# Patient Record
Sex: FEMALE | Race: OTHER | NOT HISPANIC OR LATINO | URBAN - METROPOLITAN AREA
[De-identification: names, ages, dates, MRNs, and addresses within clinical notes are randomized per-mention and may not be internally consistent; named-entity substitution may affect disease eponyms.]

---

## 2019-06-07 ENCOUNTER — EMERGENCY (EMERGENCY)
Facility: HOSPITAL | Age: 34
LOS: 1 days | Discharge: HOME | End: 2019-06-07
Attending: STUDENT IN AN ORGANIZED HEALTH CARE EDUCATION/TRAINING PROGRAM
Payer: COMMERCIAL

## 2019-06-07 VITALS
HEIGHT: 67 IN | RESPIRATION RATE: 18 BRPM | TEMPERATURE: 103 F | OXYGEN SATURATION: 98 % | DIASTOLIC BLOOD PRESSURE: 66 MMHG | SYSTOLIC BLOOD PRESSURE: 108 MMHG | HEART RATE: 102 BPM | WEIGHT: 130.07 LBS

## 2019-06-07 LAB
ALBUMIN SERPL ELPH-MCNC: 4.4 G/DL — SIGNIFICANT CHANGE UP (ref 3.3–5.2)
ALP SERPL-CCNC: 78 U/L — SIGNIFICANT CHANGE UP (ref 40–120)
ALT FLD-CCNC: 17 U/L — SIGNIFICANT CHANGE UP
ANION GAP SERPL CALC-SCNC: 13 MMOL/L — SIGNIFICANT CHANGE UP (ref 5–17)
APTT BLD: 31.6 SEC — SIGNIFICANT CHANGE UP (ref 27.5–36.3)
AST SERPL-CCNC: 24 U/L — SIGNIFICANT CHANGE UP
BASOPHILS # BLD AUTO: 0 K/UL — SIGNIFICANT CHANGE UP (ref 0–0.2)
BASOPHILS NFR BLD AUTO: 0.1 % — SIGNIFICANT CHANGE UP (ref 0–2)
BILIRUB SERPL-MCNC: 0.6 MG/DL — SIGNIFICANT CHANGE UP (ref 0.4–2)
BUN SERPL-MCNC: 12 MG/DL — SIGNIFICANT CHANGE UP (ref 8–20)
CALCIUM SERPL-MCNC: 9 MG/DL — SIGNIFICANT CHANGE UP (ref 8.6–10.2)
CHLORIDE SERPL-SCNC: 101 MMOL/L — SIGNIFICANT CHANGE UP (ref 98–107)
CO2 SERPL-SCNC: 22 MMOL/L — SIGNIFICANT CHANGE UP (ref 22–29)
CREAT SERPL-MCNC: 0.83 MG/DL — SIGNIFICANT CHANGE UP (ref 0.5–1.3)
EOSINOPHIL # BLD AUTO: 0 K/UL — SIGNIFICANT CHANGE UP (ref 0–0.5)
EOSINOPHIL NFR BLD AUTO: 0.1 % — SIGNIFICANT CHANGE UP (ref 0–6)
GLUCOSE SERPL-MCNC: 135 MG/DL — HIGH (ref 70–115)
HCT VFR BLD CALC: 36.2 % — LOW (ref 37–47)
HGB BLD-MCNC: 11.7 G/DL — LOW (ref 12–16)
INR BLD: 1.11 RATIO — SIGNIFICANT CHANGE UP (ref 0.88–1.16)
LACTATE BLDV-MCNC: 1.1 MMOL/L — SIGNIFICANT CHANGE UP (ref 0.5–2)
LYMPHOCYTES # BLD AUTO: 0.7 K/UL — LOW (ref 1–4.8)
LYMPHOCYTES # BLD AUTO: 6 % — LOW (ref 20–55)
MCHC RBC-ENTMCNC: 25.7 PG — LOW (ref 27–31)
MCHC RBC-ENTMCNC: 32.3 G/DL — SIGNIFICANT CHANGE UP (ref 32–36)
MCV RBC AUTO: 79.6 FL — LOW (ref 81–99)
MONOCYTES # BLD AUTO: 0.5 K/UL — SIGNIFICANT CHANGE UP (ref 0–0.8)
MONOCYTES NFR BLD AUTO: 4.4 % — SIGNIFICANT CHANGE UP (ref 3–10)
NEUTROPHILS # BLD AUTO: 10.9 K/UL — HIGH (ref 1.8–8)
NEUTROPHILS NFR BLD AUTO: 89.2 % — HIGH (ref 37–73)
PLATELET # BLD AUTO: 259 K/UL — SIGNIFICANT CHANGE UP (ref 150–400)
POTASSIUM SERPL-MCNC: 3.7 MMOL/L — SIGNIFICANT CHANGE UP (ref 3.5–5.3)
POTASSIUM SERPL-SCNC: 3.7 MMOL/L — SIGNIFICANT CHANGE UP (ref 3.5–5.3)
PROT SERPL-MCNC: 7.8 G/DL — SIGNIFICANT CHANGE UP (ref 6.6–8.7)
PROTHROM AB SERPL-ACNC: 12.8 SEC — SIGNIFICANT CHANGE UP (ref 10–12.9)
RBC # BLD: 4.55 M/UL — SIGNIFICANT CHANGE UP (ref 4.4–5.2)
RBC # FLD: 15.2 % — SIGNIFICANT CHANGE UP (ref 11–15.6)
SODIUM SERPL-SCNC: 136 MMOL/L — SIGNIFICANT CHANGE UP (ref 135–145)
WBC # BLD: 12.2 K/UL — HIGH (ref 4.8–10.8)
WBC # FLD AUTO: 12.2 K/UL — HIGH (ref 4.8–10.8)

## 2019-06-07 PROCEDURE — 99284 EMERGENCY DEPT VISIT MOD MDM: CPT

## 2019-06-07 PROCEDURE — 71046 X-RAY EXAM CHEST 2 VIEWS: CPT | Mod: 26

## 2019-06-07 PROCEDURE — 93010 ELECTROCARDIOGRAM REPORT: CPT

## 2019-06-07 RX ORDER — SODIUM CHLORIDE 9 MG/ML
1850 INJECTION INTRAMUSCULAR; INTRAVENOUS; SUBCUTANEOUS ONCE
Refills: 0 | Status: COMPLETED | OUTPATIENT
Start: 2019-06-07 | End: 2019-06-07

## 2019-06-07 RX ORDER — ONDANSETRON 8 MG/1
4 TABLET, FILM COATED ORAL ONCE
Refills: 0 | Status: COMPLETED | OUTPATIENT
Start: 2019-06-07 | End: 2019-06-07

## 2019-06-07 RX ADMIN — SODIUM CHLORIDE 1850 MILLILITER(S): 9 INJECTION INTRAMUSCULAR; INTRAVENOUS; SUBCUTANEOUS at 23:29

## 2019-06-07 RX ADMIN — SODIUM CHLORIDE 1850 MILLILITER(S): 9 INJECTION INTRAMUSCULAR; INTRAVENOUS; SUBCUTANEOUS at 22:29

## 2019-06-07 RX ADMIN — ONDANSETRON 4 MILLIGRAM(S): 8 TABLET, FILM COATED ORAL at 23:09

## 2019-06-07 NOTE — ED ADULT NURSE NOTE - OBJECTIVE STATEMENT
Patient presents with chills, body aches and throat pain that started today.  Code sepsis called and dr martínez at bedside.  Patient is resting in bed, lungs are clear, c/o nausea/vomiting.

## 2019-06-07 NOTE — ED PROVIDER NOTE - OBJECTIVE STATEMENT
Pt is a 35 y/o F presenting to the ED with c/o flu like illness. Pt states she woke up and went to work feeling fine and at her baseline. Came home from work when  noticed that she did not appear well. Took her temp and pt was febrile with Tmax 102. Pt c/o generalized fatigue, myalgias, and vomiting. Denies recent cough, ear pain, sore throat, rhinorrhea, CP, SOB, abd pain, urinary sxs, diarrhea, and rash. + sick contact with  who was recently ill with viral like illness Pt also in school. Pt is a 33 y/o F presenting to the ED with c/o flu like illness. Pt states she woke up and went to work feeling fine and at her baseline. Came home from work when  noticed that she did not appear well. Took her temp and pt was febrile with Tmax 102. Pt c/o generalized fatigue, myalgias, and vomiting. Denies recent cough, ear pain, sore throat, rhinorrhea, CP, SOB, abd pain, urinary sxs, diarrhea, and rash. + sick contact with  who was recently ill with viral like illness. Pt also in school.

## 2019-06-08 VITALS
TEMPERATURE: 99 F | OXYGEN SATURATION: 98 % | DIASTOLIC BLOOD PRESSURE: 58 MMHG | SYSTOLIC BLOOD PRESSURE: 103 MMHG | HEART RATE: 87 BPM | RESPIRATION RATE: 19 BRPM

## 2019-06-08 LAB
APPEARANCE UR: CLEAR — SIGNIFICANT CHANGE UP
BILIRUB UR-MCNC: NEGATIVE — SIGNIFICANT CHANGE UP
COLOR SPEC: YELLOW — SIGNIFICANT CHANGE UP
DIFF PNL FLD: ABNORMAL
EPI CELLS # UR: SIGNIFICANT CHANGE UP
FLU A RESULT: SIGNIFICANT CHANGE UP
FLU A RESULT: SIGNIFICANT CHANGE UP
FLUAV AG NPH QL: SIGNIFICANT CHANGE UP
FLUBV AG NPH QL: SIGNIFICANT CHANGE UP
GLUCOSE UR QL: NEGATIVE MG/DL — SIGNIFICANT CHANGE UP
KETONES UR-MCNC: NEGATIVE — SIGNIFICANT CHANGE UP
LEUKOCYTE ESTERASE UR-ACNC: ABNORMAL
NITRITE UR-MCNC: NEGATIVE — SIGNIFICANT CHANGE UP
PH UR: 7 — SIGNIFICANT CHANGE UP (ref 5–8)
PROT UR-MCNC: 15 MG/DL
RBC CASTS # UR COMP ASSIST: ABNORMAL /HPF (ref 0–4)
RSV RESULT: SIGNIFICANT CHANGE UP
RSV RNA RESP QL NAA+PROBE: SIGNIFICANT CHANGE UP
SP GR SPEC: 1 — LOW (ref 1.01–1.02)
UROBILINOGEN FLD QL: NEGATIVE MG/DL — SIGNIFICANT CHANGE UP
WBC UR QL: SIGNIFICANT CHANGE UP

## 2019-06-08 PROCEDURE — 85730 THROMBOPLASTIN TIME PARTIAL: CPT

## 2019-06-08 PROCEDURE — 83605 ASSAY OF LACTIC ACID: CPT

## 2019-06-08 PROCEDURE — 87040 BLOOD CULTURE FOR BACTERIA: CPT

## 2019-06-08 PROCEDURE — 85027 COMPLETE CBC AUTOMATED: CPT

## 2019-06-08 PROCEDURE — 85610 PROTHROMBIN TIME: CPT

## 2019-06-08 PROCEDURE — 99284 EMERGENCY DEPT VISIT MOD MDM: CPT | Mod: 25

## 2019-06-08 PROCEDURE — 93005 ELECTROCARDIOGRAM TRACING: CPT

## 2019-06-08 PROCEDURE — 81001 URINALYSIS AUTO W/SCOPE: CPT

## 2019-06-08 PROCEDURE — 80053 COMPREHEN METABOLIC PANEL: CPT

## 2019-06-08 PROCEDURE — 96374 THER/PROPH/DIAG INJ IV PUSH: CPT

## 2019-06-08 PROCEDURE — 96375 TX/PRO/DX INJ NEW DRUG ADDON: CPT

## 2019-06-08 PROCEDURE — 87631 RESP VIRUS 3-5 TARGETS: CPT

## 2019-06-08 PROCEDURE — 87086 URINE CULTURE/COLONY COUNT: CPT

## 2019-06-08 PROCEDURE — 36415 COLL VENOUS BLD VENIPUNCTURE: CPT

## 2019-06-08 PROCEDURE — 84702 CHORIONIC GONADOTROPIN TEST: CPT

## 2019-06-08 PROCEDURE — 96361 HYDRATE IV INFUSION ADD-ON: CPT

## 2019-06-08 PROCEDURE — 71046 X-RAY EXAM CHEST 2 VIEWS: CPT

## 2019-06-08 RX ORDER — ACETAMINOPHEN 500 MG
975 TABLET ORAL ONCE
Refills: 0 | Status: COMPLETED | OUTPATIENT
Start: 2019-06-08 | End: 2019-06-08

## 2019-06-08 RX ORDER — SODIUM CHLORIDE 9 MG/ML
1000 INJECTION INTRAMUSCULAR; INTRAVENOUS; SUBCUTANEOUS ONCE
Refills: 0 | Status: COMPLETED | OUTPATIENT
Start: 2019-06-08 | End: 2019-06-08

## 2019-06-08 RX ORDER — KETOROLAC TROMETHAMINE 30 MG/ML
15 SYRINGE (ML) INJECTION ONCE
Refills: 0 | Status: DISCONTINUED | OUTPATIENT
Start: 2019-06-08 | End: 2019-06-08

## 2019-06-08 RX ADMIN — Medication 975 MILLIGRAM(S): at 03:33

## 2019-06-08 RX ADMIN — Medication 15 MILLIGRAM(S): at 03:34

## 2019-06-08 RX ADMIN — Medication 975 MILLIGRAM(S): at 01:43

## 2019-06-08 RX ADMIN — SODIUM CHLORIDE 1000 MILLILITER(S): 9 INJECTION INTRAMUSCULAR; INTRAVENOUS; SUBCUTANEOUS at 03:33

## 2019-06-08 NOTE — ED ADULT NURSE REASSESSMENT NOTE - NS ED NURSE REASSESS COMMENT FT1
Pt continues to be febrile and slightly hypotensive, notified Dr. Bridges.  Administered 1L NaCl and 15mg toradol as ordered and will reassess.

## 2019-06-09 LAB
CULTURE RESULTS: SIGNIFICANT CHANGE UP
SPECIMEN SOURCE: SIGNIFICANT CHANGE UP

## 2019-06-12 LAB
CULTURE RESULTS: SIGNIFICANT CHANGE UP
CULTURE RESULTS: SIGNIFICANT CHANGE UP
SPECIMEN SOURCE: SIGNIFICANT CHANGE UP
SPECIMEN SOURCE: SIGNIFICANT CHANGE UP

## 2020-04-21 ENCOUNTER — APPOINTMENT (OUTPATIENT)
Dept: ANTEPARTUM | Facility: CLINIC | Age: 35
End: 2020-04-21

## 2020-04-21 ENCOUNTER — APPOINTMENT (OUTPATIENT)
Dept: ANTEPARTUM | Facility: CLINIC | Age: 35
End: 2020-04-21
Payer: COMMERCIAL

## 2020-04-23 ENCOUNTER — LABORATORY RESULT (OUTPATIENT)
Age: 35
End: 2020-04-23

## 2020-04-23 ENCOUNTER — NON-APPOINTMENT (OUTPATIENT)
Age: 35
End: 2020-04-23

## 2020-04-23 ENCOUNTER — APPOINTMENT (OUTPATIENT)
Dept: OBGYN | Facility: CLINIC | Age: 35
End: 2020-04-23
Payer: COMMERCIAL

## 2020-04-23 ENCOUNTER — ASOB RESULT (OUTPATIENT)
Age: 35
End: 2020-04-23

## 2020-04-23 ENCOUNTER — APPOINTMENT (OUTPATIENT)
Dept: ANTEPARTUM | Facility: CLINIC | Age: 35
End: 2020-04-23
Payer: COMMERCIAL

## 2020-04-23 VITALS
SYSTOLIC BLOOD PRESSURE: 115 MMHG | BODY MASS INDEX: 23.06 KG/M2 | DIASTOLIC BLOOD PRESSURE: 68 MMHG | WEIGHT: 138.38 LBS | HEIGHT: 65 IN

## 2020-04-23 DIAGNOSIS — Z78.9 OTHER SPECIFIED HEALTH STATUS: ICD-10-CM

## 2020-04-23 DIAGNOSIS — Z3A.21 21 WEEKS GESTATION OF PREGNANCY: ICD-10-CM

## 2020-04-23 PROCEDURE — 76805 OB US >/= 14 WKS SNGL FETUS: CPT

## 2020-04-23 PROCEDURE — 99213 OFFICE O/P EST LOW 20 MIN: CPT

## 2020-04-23 RX ORDER — NEOMYCIN AND POLYMYXIN B SULFATES AND HYDROCORTISONE OTIC 10; 3.5; 1 MG/ML; MG/ML; [USP'U]/ML
3.5-10000-1 SUSPENSION AURICULAR (OTIC)
Qty: 10 | Refills: 0 | Status: COMPLETED | COMMUNITY
Start: 2019-11-19

## 2020-04-23 RX ORDER — CHLORHEXIDINE GLUCONATE 4 %
325 (65 FE) LIQUID (ML) TOPICAL 3 TIMES DAILY
Qty: 180 | Refills: 0 | Status: ACTIVE | COMMUNITY
Start: 2020-04-23 | End: 1900-01-01

## 2020-04-27 LAB
ALBUMIN SERPL ELPH-MCNC: 3.7 G/DL
ALP BLD-CCNC: 77 U/L
ALT SERPL-CCNC: 13 U/L
ANION GAP SERPL CALC-SCNC: 15 MMOL/L
APPEARANCE: ABNORMAL
AST SERPL-CCNC: 17 U/L
BASOPHILS # BLD AUTO: 0.04 K/UL
BASOPHILS NFR BLD AUTO: 0.4 %
BILIRUB SERPL-MCNC: 0.4 MG/DL
BILIRUBIN URINE: NEGATIVE
BLOOD URINE: NEGATIVE
BUN SERPL-MCNC: 9 MG/DL
CALCIUM SERPL-MCNC: 8.9 MG/DL
CHLORIDE SERPL-SCNC: 102 MMOL/L
CO2 SERPL-SCNC: 22 MMOL/L
COLOR: YELLOW
CREAT SERPL-MCNC: 0.58 MG/DL
EOSINOPHIL # BLD AUTO: 0.18 K/UL
EOSINOPHIL NFR BLD AUTO: 1.6 %
ESTIMATED AVERAGE GLUCOSE: 103 MG/DL
GLUCOSE QUALITATIVE U: NEGATIVE
GLUCOSE SERPL-MCNC: 41 MG/DL
HBA1C MFR BLD HPLC: 5.2 %
HCT VFR BLD CALC: 35.3 %
HGB BLD-MCNC: 11.5 G/DL
HIV1+2 AB SPEC QL IA.RAPID: NONREACTIVE
IMM GRANULOCYTES NFR BLD AUTO: 0.4 %
KETONES URINE: NEGATIVE
LEUKOCYTE ESTERASE URINE: NEGATIVE
LYMPHOCYTES # BLD AUTO: 2.24 K/UL
LYMPHOCYTES NFR BLD AUTO: 20 %
MAN DIFF?: NORMAL
MCHC RBC-ENTMCNC: 29.6 PG
MCHC RBC-ENTMCNC: 32.6 GM/DL
MCV RBC AUTO: 90.7 FL
MONOCYTES # BLD AUTO: 0.71 K/UL
MONOCYTES NFR BLD AUTO: 6.3 %
MUV AB SER-ACNC: NEGATIVE
MUV IGG SER QL IA: 8 AU/ML
NEUTROPHILS # BLD AUTO: 7.99 K/UL
NEUTROPHILS NFR BLD AUTO: 71.3 %
NITRITE URINE: NEGATIVE
PH URINE: 7
PLATELET # BLD AUTO: 246 K/UL
POTASSIUM SERPL-SCNC: 4.8 MMOL/L
PROT SERPL-MCNC: 6.3 G/DL
PROTEIN URINE: NORMAL
RBC # BLD: 3.89 M/UL
RBC # FLD: 14.4 %
SODIUM SERPL-SCNC: 139 MMOL/L
SPECIFIC GRAVITY URINE: 1.02
T GONDII AB SER-IMP: NEGATIVE
T GONDII IGG SER QL: <3 IU/ML
TSH SERPL-ACNC: 1.25 UIU/ML
UROBILINOGEN URINE: NORMAL
WBC # FLD AUTO: 11.2 K/UL

## 2020-05-26 ENCOUNTER — APPOINTMENT (OUTPATIENT)
Dept: OBGYN | Facility: CLINIC | Age: 35
End: 2020-05-26
Payer: COMMERCIAL

## 2020-05-26 ENCOUNTER — NON-APPOINTMENT (OUTPATIENT)
Age: 35
End: 2020-05-26

## 2020-05-26 VITALS
HEIGHT: 65 IN | SYSTOLIC BLOOD PRESSURE: 104 MMHG | DIASTOLIC BLOOD PRESSURE: 62 MMHG | BODY MASS INDEX: 23.49 KG/M2 | WEIGHT: 141 LBS

## 2020-05-26 PROCEDURE — 99213 OFFICE O/P EST LOW 20 MIN: CPT | Mod: TH

## 2020-05-26 PROCEDURE — 36415 COLL VENOUS BLD VENIPUNCTURE: CPT

## 2020-05-30 LAB
BASOPHILS # BLD AUTO: 0.02 K/UL
BASOPHILS NFR BLD AUTO: 0.2 %
EOSINOPHIL # BLD AUTO: 0.25 K/UL
EOSINOPHIL NFR BLD AUTO: 2.7 %
GLUCOSE 1H P 50 G GLC PO SERPL-MCNC: 131 MG/DL
HCT VFR BLD CALC: 37.4 %
HGB BLD-MCNC: 11.7 G/DL
IMM GRANULOCYTES NFR BLD AUTO: 0.3 %
LYMPHOCYTES # BLD AUTO: 1.6 K/UL
LYMPHOCYTES NFR BLD AUTO: 17.1 %
MAN DIFF?: NORMAL
MCHC RBC-ENTMCNC: 30 PG
MCHC RBC-ENTMCNC: 31.3 GM/DL
MCV RBC AUTO: 95.9 FL
MONOCYTES # BLD AUTO: 0.46 K/UL
MONOCYTES NFR BLD AUTO: 4.9 %
NEUTROPHILS # BLD AUTO: 6.97 K/UL
NEUTROPHILS NFR BLD AUTO: 74.8 %
PLATELET # BLD AUTO: 193 K/UL
RBC # BLD: 3.9 M/UL
RBC # FLD: 14.8 %
WBC # FLD AUTO: 9.33 K/UL

## 2020-06-18 ENCOUNTER — NON-APPOINTMENT (OUTPATIENT)
Age: 35
End: 2020-06-18

## 2020-06-18 ENCOUNTER — APPOINTMENT (OUTPATIENT)
Dept: OBGYN | Facility: CLINIC | Age: 35
End: 2020-06-18
Payer: COMMERCIAL

## 2020-06-18 VITALS
SYSTOLIC BLOOD PRESSURE: 100 MMHG | HEIGHT: 65 IN | DIASTOLIC BLOOD PRESSURE: 60 MMHG | WEIGHT: 142 LBS | BODY MASS INDEX: 23.66 KG/M2

## 2020-06-18 DIAGNOSIS — Z3A.29 29 WEEKS GESTATION OF PREGNANCY: ICD-10-CM

## 2020-06-18 PROCEDURE — 99213 OFFICE O/P EST LOW 20 MIN: CPT

## 2020-06-30 DIAGNOSIS — Z3A.26 26 WEEKS GESTATION OF PREGNANCY: ICD-10-CM

## 2020-06-30 DIAGNOSIS — Z34.92 ENCOUNTER FOR SUPERVISION OF NORMAL PREGNANCY, UNSPECIFIED, SECOND TRIMESTER: ICD-10-CM

## 2020-06-30 DIAGNOSIS — Z34.82 ENCOUNTER FOR SUPERVISION OF OTHER NORMAL PREGNANCY, SECOND TRIMESTER: ICD-10-CM

## 2020-07-01 ENCOUNTER — APPOINTMENT (OUTPATIENT)
Dept: ANTEPARTUM | Facility: CLINIC | Age: 35
End: 2020-07-01
Payer: COMMERCIAL

## 2020-07-01 ENCOUNTER — ASOB RESULT (OUTPATIENT)
Age: 35
End: 2020-07-01

## 2020-07-01 PROCEDURE — 76816 OB US FOLLOW-UP PER FETUS: CPT

## 2020-07-06 ENCOUNTER — NON-APPOINTMENT (OUTPATIENT)
Age: 35
End: 2020-07-06

## 2020-07-06 ENCOUNTER — APPOINTMENT (OUTPATIENT)
Dept: OBGYN | Facility: CLINIC | Age: 35
End: 2020-07-06
Payer: COMMERCIAL

## 2020-07-06 VITALS
WEIGHT: 146.5 LBS | DIASTOLIC BLOOD PRESSURE: 62 MMHG | BODY MASS INDEX: 24.41 KG/M2 | HEIGHT: 65 IN | SYSTOLIC BLOOD PRESSURE: 102 MMHG

## 2020-07-06 DIAGNOSIS — Z3A.32 32 WEEKS GESTATION OF PREGNANCY: ICD-10-CM

## 2020-07-06 PROCEDURE — 90715 TDAP VACCINE 7 YRS/> IM: CPT

## 2020-07-06 PROCEDURE — 99213 OFFICE O/P EST LOW 20 MIN: CPT | Mod: 25

## 2020-07-06 PROCEDURE — 90471 IMMUNIZATION ADMIN: CPT

## 2020-07-07 RX ORDER — VITAMIN C, CALCIUM, IRON, VITAMIN D3, VITAMIN E, THIAMIN, RIBOFLAVIN, NIACINAMIDE, VITAMIN B6, FOLIC ACID, IODINE, ZINC, COPPER, DOCUSATE SODIUM 120; 85; 30; 3; 20; 20; 1; 25; 2; 50; 159; 4.54; 150; 5; 400; 3.4 MG/1; MG/1; [IU]/1; MG/1; MG/1; MG/1; MG/1; MG/1; MG/1; MG/1; MG/1; MG/1; UG/1; MG/1; [IU]/1; MG/1
90-1 & 300 TABLET ORAL
Qty: 1 | Refills: 3 | Status: ACTIVE | COMMUNITY
Start: 2020-04-23 | End: 1900-01-01

## 2020-07-23 RX ORDER — ASCORBIC ACID, CALCIUM CITRATE, IRON, CHOLECALCIFEROL, PYRIDOXINE HYDROCHLORIDE, AND FOLIC ACID 20-1-25 MG
20-1 MG & KIT ORAL
Qty: 90 | Refills: 0 | Status: ACTIVE | COMMUNITY
Start: 2020-07-22 | End: 1900-01-01

## 2020-07-23 RX ORDER — FOLIC ACID 1 MG/1
1 TABLET ORAL
Qty: 30 | Refills: 6 | Status: ACTIVE | COMMUNITY
Start: 2020-03-28 | End: 1900-01-01

## 2020-07-28 ENCOUNTER — NON-APPOINTMENT (OUTPATIENT)
Age: 35
End: 2020-07-28

## 2020-07-28 ENCOUNTER — APPOINTMENT (OUTPATIENT)
Dept: OBGYN | Facility: CLINIC | Age: 35
End: 2020-07-28
Payer: COMMERCIAL

## 2020-07-28 VITALS
DIASTOLIC BLOOD PRESSURE: 75 MMHG | WEIGHT: 147 LBS | HEIGHT: 65 IN | SYSTOLIC BLOOD PRESSURE: 120 MMHG | BODY MASS INDEX: 24.49 KG/M2

## 2020-07-28 PROCEDURE — 36415 COLL VENOUS BLD VENIPUNCTURE: CPT

## 2020-07-28 PROCEDURE — 99213 OFFICE O/P EST LOW 20 MIN: CPT

## 2020-07-30 LAB
BASOPHILS # BLD AUTO: 0.02 K/UL
BASOPHILS NFR BLD AUTO: 0.2 %
EOSINOPHIL # BLD AUTO: 0.2 K/UL
EOSINOPHIL NFR BLD AUTO: 2.1 %
HCT VFR BLD CALC: 38.9 %
HGB BLD-MCNC: 11.9 G/DL
HIV1+2 AB SPEC QL IA.RAPID: NONREACTIVE
IMM GRANULOCYTES NFR BLD AUTO: 0.4 %
LYMPHOCYTES # BLD AUTO: 1.88 K/UL
LYMPHOCYTES NFR BLD AUTO: 19.9 %
MAN DIFF?: NORMAL
MCHC RBC-ENTMCNC: 29.7 PG
MCHC RBC-ENTMCNC: 30.6 GM/DL
MCV RBC AUTO: 97 FL
MONOCYTES # BLD AUTO: 0.5 K/UL
MONOCYTES NFR BLD AUTO: 5.3 %
NEUTROPHILS # BLD AUTO: 6.8 K/UL
NEUTROPHILS NFR BLD AUTO: 72.1 %
PLATELET # BLD AUTO: 176 K/UL
RBC # BLD: 4.01 M/UL
RBC # FLD: 13.4 %
WBC # FLD AUTO: 9.44 K/UL

## 2020-08-05 ENCOUNTER — ASOB RESULT (OUTPATIENT)
Age: 35
End: 2020-08-05

## 2020-08-05 ENCOUNTER — APPOINTMENT (OUTPATIENT)
Dept: ANTEPARTUM | Facility: CLINIC | Age: 35
End: 2020-08-05
Payer: COMMERCIAL

## 2020-08-05 DIAGNOSIS — Z3A.35 35 WEEKS GESTATION OF PREGNANCY: ICD-10-CM

## 2020-08-05 PROCEDURE — 76816 OB US FOLLOW-UP PER FETUS: CPT

## 2020-08-05 PROCEDURE — 76819 FETAL BIOPHYS PROFIL W/O NST: CPT

## 2020-08-11 ENCOUNTER — NON-APPOINTMENT (OUTPATIENT)
Age: 35
End: 2020-08-11

## 2020-08-11 ENCOUNTER — APPOINTMENT (OUTPATIENT)
Dept: OBGYN | Facility: CLINIC | Age: 35
End: 2020-08-11
Payer: COMMERCIAL

## 2020-08-11 VITALS — WEIGHT: 148.5 LBS | SYSTOLIC BLOOD PRESSURE: 110 MMHG | DIASTOLIC BLOOD PRESSURE: 70 MMHG | BODY MASS INDEX: 24.71 KG/M2

## 2020-08-11 PROCEDURE — 99213 OFFICE O/P EST LOW 20 MIN: CPT

## 2020-08-12 ENCOUNTER — LABORATORY RESULT (OUTPATIENT)
Age: 35
End: 2020-08-12

## 2020-08-12 DIAGNOSIS — Z3A.37 37 WEEKS GESTATION OF PREGNANCY: ICD-10-CM

## 2020-08-12 DIAGNOSIS — Z34.83 ENCOUNTER FOR SUPERVISION OF OTHER NORMAL PREGNANCY, THIRD TRIMESTER: ICD-10-CM

## 2020-08-12 DIAGNOSIS — K59.00 DISEASES OF THE DIGESTIVE SYSTEM COMPLICATING PREGNANCY, SECOND TRIMESTER: ICD-10-CM

## 2020-08-12 DIAGNOSIS — O99.612 DISEASES OF THE DIGESTIVE SYSTEM COMPLICATING PREGNANCY, SECOND TRIMESTER: ICD-10-CM

## 2020-08-12 DIAGNOSIS — Z23 ENCOUNTER FOR IMMUNIZATION: ICD-10-CM

## 2020-08-24 ENCOUNTER — APPOINTMENT (OUTPATIENT)
Dept: OBGYN | Facility: CLINIC | Age: 35
End: 2020-08-24
Payer: COMMERCIAL

## 2020-08-24 ENCOUNTER — NON-APPOINTMENT (OUTPATIENT)
Age: 35
End: 2020-08-24

## 2020-08-24 VITALS
SYSTOLIC BLOOD PRESSURE: 102 MMHG | BODY MASS INDEX: 25.37 KG/M2 | DIASTOLIC BLOOD PRESSURE: 64 MMHG | WEIGHT: 152.44 LBS

## 2020-08-24 DIAGNOSIS — Z3A.39 39 WEEKS GESTATION OF PREGNANCY: ICD-10-CM

## 2020-08-24 PROCEDURE — 99213 OFFICE O/P EST LOW 20 MIN: CPT | Mod: 25

## 2020-08-24 PROCEDURE — 59025 FETAL NON-STRESS TEST: CPT

## 2020-09-01 ENCOUNTER — INPATIENT (INPATIENT)
Facility: HOSPITAL | Age: 35
LOS: 1 days | Discharge: HOME | End: 2020-09-03
Attending: SPECIALIST | Admitting: SPECIALIST
Payer: COMMERCIAL

## 2020-09-01 ENCOUNTER — NON-APPOINTMENT (OUTPATIENT)
Age: 35
End: 2020-09-01

## 2020-09-01 ENCOUNTER — APPOINTMENT (OUTPATIENT)
Dept: OBGYN | Facility: CLINIC | Age: 35
End: 2020-09-01
Payer: COMMERCIAL

## 2020-09-01 VITALS
DIASTOLIC BLOOD PRESSURE: 79 MMHG | HEART RATE: 75 BPM | RESPIRATION RATE: 16 BRPM | SYSTOLIC BLOOD PRESSURE: 124 MMHG | TEMPERATURE: 98 F

## 2020-09-01 VITALS
DIASTOLIC BLOOD PRESSURE: 70 MMHG | SYSTOLIC BLOOD PRESSURE: 102 MMHG | HEIGHT: 65 IN | BODY MASS INDEX: 25.74 KG/M2 | WEIGHT: 154.5 LBS

## 2020-09-01 DIAGNOSIS — O26.893 OTHER SPECIFIED PREGNANCY RELATED CONDITIONS, THIRD TRIMESTER: ICD-10-CM

## 2020-09-01 DIAGNOSIS — Z3A.40 40 WEEKS GESTATION OF PREGNANCY: ICD-10-CM

## 2020-09-01 LAB
APPEARANCE UR: CLEAR — SIGNIFICANT CHANGE UP
BACTERIA # UR AUTO: ABNORMAL
BASOPHILS # BLD AUTO: 0.02 K/UL — SIGNIFICANT CHANGE UP (ref 0–0.2)
BASOPHILS NFR BLD AUTO: 0.2 % — SIGNIFICANT CHANGE UP (ref 0–2)
BILIRUB UR-MCNC: NEGATIVE — SIGNIFICANT CHANGE UP
BLD GP AB SCN SERPL QL: SIGNIFICANT CHANGE UP
COLOR SPEC: YELLOW — SIGNIFICANT CHANGE UP
DIFF PNL FLD: NEGATIVE — SIGNIFICANT CHANGE UP
EOSINOPHIL # BLD AUTO: 0.16 K/UL — SIGNIFICANT CHANGE UP (ref 0–0.5)
EOSINOPHIL NFR BLD AUTO: 1.6 % — SIGNIFICANT CHANGE UP (ref 0–6)
EPI CELLS # UR: SIGNIFICANT CHANGE UP
GLUCOSE UR QL: NEGATIVE MG/DL — SIGNIFICANT CHANGE UP
HCT VFR BLD CALC: 38.1 % — SIGNIFICANT CHANGE UP (ref 34.5–45)
HGB BLD-MCNC: 12.6 G/DL — SIGNIFICANT CHANGE UP (ref 11.5–15.5)
IMM GRANULOCYTES NFR BLD AUTO: 0.6 % — SIGNIFICANT CHANGE UP (ref 0–1.5)
KETONES UR-MCNC: NEGATIVE — SIGNIFICANT CHANGE UP
LEUKOCYTE ESTERASE UR-ACNC: ABNORMAL
LYMPHOCYTES # BLD AUTO: 2.05 K/UL — SIGNIFICANT CHANGE UP (ref 1–3.3)
LYMPHOCYTES # BLD AUTO: 20.3 % — SIGNIFICANT CHANGE UP (ref 13–44)
MCHC RBC-ENTMCNC: 30.4 PG — SIGNIFICANT CHANGE UP (ref 27–34)
MCHC RBC-ENTMCNC: 33.1 GM/DL — SIGNIFICANT CHANGE UP (ref 32–36)
MCV RBC AUTO: 92 FL — SIGNIFICANT CHANGE UP (ref 80–100)
MONOCYTES # BLD AUTO: 0.71 K/UL — SIGNIFICANT CHANGE UP (ref 0–0.9)
MONOCYTES NFR BLD AUTO: 7 % — SIGNIFICANT CHANGE UP (ref 2–14)
NEUTROPHILS # BLD AUTO: 7.12 K/UL — SIGNIFICANT CHANGE UP (ref 1.8–7.4)
NEUTROPHILS NFR BLD AUTO: 70.3 % — SIGNIFICANT CHANGE UP (ref 43–77)
NITRITE UR-MCNC: NEGATIVE — SIGNIFICANT CHANGE UP
PH UR: 7 — SIGNIFICANT CHANGE UP (ref 5–8)
PLATELET # BLD AUTO: 171 K/UL — SIGNIFICANT CHANGE UP (ref 150–400)
PROT UR-MCNC: NEGATIVE MG/DL — SIGNIFICANT CHANGE UP
RBC # BLD: 4.14 M/UL — SIGNIFICANT CHANGE UP (ref 3.8–5.2)
RBC # FLD: 13.5 % — SIGNIFICANT CHANGE UP (ref 10.3–14.5)
RBC CASTS # UR COMP ASSIST: NEGATIVE /HPF — SIGNIFICANT CHANGE UP (ref 0–4)
SARS-COV-2 RNA SPEC QL NAA+PROBE: SIGNIFICANT CHANGE UP
SP GR SPEC: 1 — LOW (ref 1.01–1.02)
T PALLIDUM AB TITR SER: NEGATIVE — SIGNIFICANT CHANGE UP
UROBILINOGEN FLD QL: NEGATIVE MG/DL — SIGNIFICANT CHANGE UP
WBC # BLD: 10.12 K/UL — SIGNIFICANT CHANGE UP (ref 3.8–10.5)
WBC # FLD AUTO: 10.12 K/UL — SIGNIFICANT CHANGE UP (ref 3.8–10.5)
WBC UR QL: SIGNIFICANT CHANGE UP

## 2020-09-01 PROCEDURE — 99213 OFFICE O/P EST LOW 20 MIN: CPT | Mod: 25

## 2020-09-01 PROCEDURE — 59025 FETAL NON-STRESS TEST: CPT

## 2020-09-01 RX ORDER — SODIUM CHLORIDE 9 MG/ML
1000 INJECTION, SOLUTION INTRAVENOUS
Refills: 0 | Status: DISCONTINUED | OUTPATIENT
Start: 2020-09-01 | End: 2020-09-02

## 2020-09-01 RX ORDER — CITRIC ACID/SODIUM CITRATE 300-500 MG
30 SOLUTION, ORAL ORAL ONCE
Refills: 0 | Status: DISCONTINUED | OUTPATIENT
Start: 2020-09-01 | End: 2020-09-02

## 2020-09-01 RX ORDER — OXYTOCIN 10 UNIT/ML
2 VIAL (ML) INJECTION
Qty: 30 | Refills: 0 | Status: DISCONTINUED | OUTPATIENT
Start: 2020-09-01 | End: 2020-09-01

## 2020-09-01 RX ORDER — OXYTOCIN 10 UNIT/ML
333.33 VIAL (ML) INJECTION
Qty: 20 | Refills: 0 | Status: COMPLETED | OUTPATIENT
Start: 2020-09-01 | End: 2020-09-01

## 2020-09-01 RX ORDER — AMPICILLIN TRIHYDRATE 250 MG
CAPSULE ORAL
Refills: 0 | Status: DISCONTINUED | OUTPATIENT
Start: 2020-09-01 | End: 2020-09-01

## 2020-09-01 RX ORDER — SODIUM CHLORIDE 9 MG/ML
500 INJECTION INTRAMUSCULAR; INTRAVENOUS; SUBCUTANEOUS ONCE
Refills: 0 | Status: DISCONTINUED | OUTPATIENT
Start: 2020-09-01 | End: 2020-09-03

## 2020-09-01 RX ORDER — AMPICILLIN TRIHYDRATE 250 MG
2 CAPSULE ORAL ONCE
Refills: 0 | Status: COMPLETED | OUTPATIENT
Start: 2020-09-01 | End: 2020-09-01

## 2020-09-01 RX ORDER — OXYTOCIN 10 UNIT/ML
2 VIAL (ML) INJECTION
Qty: 30 | Refills: 0 | Status: DISCONTINUED | OUTPATIENT
Start: 2020-09-01 | End: 2020-09-03

## 2020-09-01 RX ORDER — AMPICILLIN TRIHYDRATE 250 MG
1 CAPSULE ORAL EVERY 4 HOURS
Refills: 0 | Status: DISCONTINUED | OUTPATIENT
Start: 2020-09-01 | End: 2020-09-02

## 2020-09-01 RX ADMIN — Medication 108 GRAM(S): at 21:36

## 2020-09-01 RX ADMIN — Medication 216 GRAM(S): at 16:54

## 2020-09-01 RX ADMIN — Medication 2 MILLIUNIT(S)/MIN: at 21:36

## 2020-09-01 NOTE — OB PROVIDER H&P - HISTORY OF PRESENT ILLNESS
35y year old  at [id number] presented to IOL at term for nonreactive tracing in the office. Prenatal care with Dr. Cartwright  Denies: vaginal bleeding, vaginal d/c, contractions, loss of fluid. Reports uneventful pregnancy.  +fetal movement    PMH: none  PSH: none  OBH: regular cycles, no history of STI  Alllergy: NKDA  meds: PNV  Preg complications: none    T(C): 36.9 (20 @ 10:29), Max: 36.9 (20 @ 10:29)  HR: 75 (20 @ 10:29) (75 - 75)  BP: 124/79 (20 @ 10:29) (124/79 - 124/79)  RR: 16 (20 @ 10:29) (16 - 16)  Gen: NAD  Pulm: CTABL  CVR: RRR nl S1 S2  Abd: softly distended, gravid, + BS   Pelvic:   Bedside Ultrasound:  Tracin, moderate variability, + accelerations no decelerations  GBS: positive

## 2020-09-01 NOTE — OB PROVIDER H&P - ASSESSMENT
A/P: 35 year old  at [id number] IOL   -admit to L&D  -routine labs  -cytotec PO  -IV fluids  -discussed with attending Dr. Farr

## 2020-09-01 NOTE — OB PROVIDER LABOR PROGRESS NOTE - ASSESSMENT
Patient had another 2-minute prolonged decel with return to baseline after maternal repositioning, IV fluid hydration and O2 via NRB  [place]: q2-4m  SVE: 5/50/-3 (by AIYANA Durbin, patient refusing male provider)    Vital Signs Last 24 Hrs  T(C): 36.9 (01 Sep 2020 11:06), Max: 36.9 (01 Sep 2020 10:29)  T(F): 98.4 (01 Sep 2020 11:06), Max: 98.42 (01 Sep 2020 10:29)  HR: 75 (01 Sep 2020 22:43) (65 - 101)  BP: 104/56 (01 Sep 2020 22:36) (104/56 - 141/83)  RR: 16 (01 Sep 2020 11:06) (16 - 16)  SpO2: 100% (01 Sep 2020 22:43) (93% - 100%)    Dr. Cartwright aware  Will continue to monitor Patient had another 2-minute prolonged decel with return to baseline after maternal repositioning, IV fluid hydration and O2 via NRB  [place]: q2-4m  SVE: 5/50/-3 (by AIYANA Durbin, patient refusing male provider)    Vital Signs Last 24 Hrs  T(C): 36.9 (01 Sep 2020 11:06), Max: 36.9 (01 Sep 2020 10:29)  T(F): 98.4 (01 Sep 2020 11:06), Max: 98.42 (01 Sep 2020 10:29)  HR: 75 (01 Sep 2020 22:43) (65 - 101)  BP: 104/56 (01 Sep 2020 22:36) (104/56 - 141/83)  RR: 16 (01 Sep 2020 11:06) (16 - 16)  SpO2: 100% (01 Sep 2020 22:43) (93% - 100%)    Dr. Cartwright aware  Will continue to monitor  ATTENDING NOTE  Patient seen and examined at approx. 10:45 PM. She had no complaints.  moderate variability with variable decel, prolong decel at 10:37 PM x 2 minutes contractions irregular. Abd gravid, nontender. Cervical exam unchanged IUPC placed. Patient P3 art 40+ weeks with CAT 2 FHT with ROM  labor .  Start Amnioinfusion, maternal repositioning.   Will continue to closely monitor the patient.    LETTY Cartwright MD

## 2020-09-01 NOTE — OB PROVIDER LABOR PROGRESS NOTE - ASSESSMENT
35 y.o.  at 40 weeks 1 day gestation admitted for IOL for NRFHT, s/p cytotec. Cat 1 FHT. On ampicillin for GBS ppx.     - Induction: discontinue cytotec and start pitocin   - continuous toco, maternal and fetal heart tracing  - continue ampicllin    Discussed with Dr. Cartwright 35 y.o.  at 40 weeks 1 day gestation admitted for IOL for NRFHT, s/p cytotec. Cat 1 FHT. On ampicillin for GBS ppx.     - Induction: discontinue cytotec and start pitocin   - continuous toco, maternal and fetal heart tracing  - continue ampicllin    Discussed with Dr. Cartwright    ATTENDING NOTE  Patient was seen and evaluated at bedside at approx. 9 PM. She is s/p epidural. She has no complaints. VSS afebrile FHT CAT 1.   She is P3 in labor with ROM  FHT CAT 1. Start pitocin for augmentation.  I agree with above resident note.    LETTY Cartwright MD

## 2020-09-01 NOTE — OB PROVIDER H&P - ATTENDING COMMENTS
Patient admitted at 40 weeks 1 day presents from OB's office due to decreased fetal movement and a non-reactive NST for an induction of labor.  Patient counseled re: risks/benefits of induction  Poor Buckley Score - plan for cytotec induction of labor, and possible balloon placement

## 2020-09-01 NOTE — OB PROVIDER LABOR PROGRESS NOTE - ASSESSMENT
Patient noted to have 2x late decelerations in the past 20minsm last deceleration approx 7min ago.  FHT recovered with maternal repositioning, IV fluids and O2 via NRB.  Discussed with patient that we will hold off on Pitocin for now and plan to re-start it in ~30min, if the tracing is Cat 1    Vital Signs Last 24 Hrs  T(C): 36.9 (01 Sep 2020 11:06), Max: 36.9 (01 Sep 2020 10:29)  T(F): 98.4 (01 Sep 2020 11:06), Max: 98.42 (01 Sep 2020 10:29)  HR: 73 (01 Sep 2020 22:23) (65 - 101)  BP: 110/67 (01 Sep 2020 22:23) (107/64 - 141/83)  RR: 16 (01 Sep 2020 11:06) (16 - 16)  SpO2: 99% (01 Sep 2020 22:23) (93% - 100%)    d/w Dr. Cartwright

## 2020-09-02 ENCOUNTER — TRANSCRIPTION ENCOUNTER (OUTPATIENT)
Age: 35
End: 2020-09-02

## 2020-09-02 LAB
HCT VFR BLD CALC: 33.5 % — LOW (ref 34.5–45)
HGB BLD-MCNC: 11.4 G/DL — LOW (ref 11.5–15.5)
MCHC RBC-ENTMCNC: 31.6 PG — SIGNIFICANT CHANGE UP (ref 27–34)
MCHC RBC-ENTMCNC: 34 GM/DL — SIGNIFICANT CHANGE UP (ref 32–36)
MCV RBC AUTO: 92.8 FL — SIGNIFICANT CHANGE UP (ref 80–100)
PLATELET # BLD AUTO: 138 K/UL — LOW (ref 150–400)
RBC # BLD: 3.61 M/UL — LOW (ref 3.8–5.2)
RBC # FLD: 13.5 % — SIGNIFICANT CHANGE UP (ref 10.3–14.5)
WBC # BLD: 15.2 K/UL — HIGH (ref 3.8–10.5)
WBC # FLD AUTO: 15.2 K/UL — HIGH (ref 3.8–10.5)

## 2020-09-02 PROCEDURE — 59409 OBSTETRICAL CARE: CPT | Mod: U9

## 2020-09-02 RX ORDER — DIBUCAINE 1 %
1 OINTMENT (GRAM) RECTAL EVERY 6 HOURS
Refills: 0 | Status: DISCONTINUED | OUTPATIENT
Start: 2020-09-02 | End: 2020-09-03

## 2020-09-02 RX ORDER — AER TRAVELER 0.5 G/1
1 SOLUTION RECTAL; TOPICAL EVERY 4 HOURS
Refills: 0 | Status: DISCONTINUED | OUTPATIENT
Start: 2020-09-02 | End: 2020-09-03

## 2020-09-02 RX ORDER — MAGNESIUM HYDROXIDE 400 MG/1
30 TABLET, CHEWABLE ORAL
Refills: 0 | Status: DISCONTINUED | OUTPATIENT
Start: 2020-09-02 | End: 2020-09-03

## 2020-09-02 RX ORDER — ACETAMINOPHEN 500 MG
3 TABLET ORAL
Qty: 0 | Refills: 0 | DISCHARGE
Start: 2020-09-02

## 2020-09-02 RX ORDER — HYDROCORTISONE 1 %
1 OINTMENT (GRAM) TOPICAL EVERY 6 HOURS
Refills: 0 | Status: DISCONTINUED | OUTPATIENT
Start: 2020-09-02 | End: 2020-09-03

## 2020-09-02 RX ORDER — DIPHENHYDRAMINE HCL 50 MG
25 CAPSULE ORAL EVERY 6 HOURS
Refills: 0 | Status: DISCONTINUED | OUTPATIENT
Start: 2020-09-02 | End: 2020-09-03

## 2020-09-02 RX ORDER — IBUPROFEN 200 MG
1 TABLET ORAL
Qty: 0 | Refills: 0 | DISCHARGE
Start: 2020-09-02

## 2020-09-02 RX ORDER — LANOLIN
1 OINTMENT (GRAM) TOPICAL EVERY 6 HOURS
Refills: 0 | Status: DISCONTINUED | OUTPATIENT
Start: 2020-09-02 | End: 2020-09-03

## 2020-09-02 RX ORDER — PRAMOXINE HYDROCHLORIDE 150 MG/15G
1 AEROSOL, FOAM RECTAL EVERY 4 HOURS
Refills: 0 | Status: DISCONTINUED | OUTPATIENT
Start: 2020-09-02 | End: 2020-09-03

## 2020-09-02 RX ORDER — BENZOCAINE 10 %
1 GEL (GRAM) MUCOUS MEMBRANE EVERY 6 HOURS
Refills: 0 | Status: DISCONTINUED | OUTPATIENT
Start: 2020-09-02 | End: 2020-09-03

## 2020-09-02 RX ORDER — OXYCODONE HYDROCHLORIDE 5 MG/1
5 TABLET ORAL
Refills: 0 | Status: DISCONTINUED | OUTPATIENT
Start: 2020-09-02 | End: 2020-09-03

## 2020-09-02 RX ORDER — ACETAMINOPHEN 500 MG
975 TABLET ORAL
Refills: 0 | Status: DISCONTINUED | OUTPATIENT
Start: 2020-09-02 | End: 2020-09-03

## 2020-09-02 RX ORDER — OXYTOCIN 10 UNIT/ML
333.33 VIAL (ML) INJECTION
Qty: 20 | Refills: 0 | Status: DISCONTINUED | OUTPATIENT
Start: 2020-09-02 | End: 2020-09-03

## 2020-09-02 RX ORDER — IBUPROFEN 200 MG
600 TABLET ORAL EVERY 6 HOURS
Refills: 0 | Status: COMPLETED | OUTPATIENT
Start: 2020-09-02 | End: 2021-08-01

## 2020-09-02 RX ORDER — SODIUM CHLORIDE 9 MG/ML
3 INJECTION INTRAMUSCULAR; INTRAVENOUS; SUBCUTANEOUS EVERY 8 HOURS
Refills: 0 | Status: DISCONTINUED | OUTPATIENT
Start: 2020-09-02 | End: 2020-09-03

## 2020-09-02 RX ORDER — IBUPROFEN 200 MG
600 TABLET ORAL EVERY 6 HOURS
Refills: 0 | Status: DISCONTINUED | OUTPATIENT
Start: 2020-09-02 | End: 2020-09-03

## 2020-09-02 RX ORDER — SIMETHICONE 80 MG/1
80 TABLET, CHEWABLE ORAL EVERY 4 HOURS
Refills: 0 | Status: DISCONTINUED | OUTPATIENT
Start: 2020-09-02 | End: 2020-09-03

## 2020-09-02 RX ORDER — TETANUS TOXOID, REDUCED DIPHTHERIA TOXOID AND ACELLULAR PERTUSSIS VACCINE, ADSORBED 5; 2.5; 8; 8; 2.5 [IU]/.5ML; [IU]/.5ML; UG/.5ML; UG/.5ML; UG/.5ML
0.5 SUSPENSION INTRAMUSCULAR ONCE
Refills: 0 | Status: DISCONTINUED | OUTPATIENT
Start: 2020-09-02 | End: 2020-09-03

## 2020-09-02 RX ORDER — OXYCODONE HYDROCHLORIDE 5 MG/1
5 TABLET ORAL ONCE
Refills: 0 | Status: DISCONTINUED | OUTPATIENT
Start: 2020-09-02 | End: 2020-09-03

## 2020-09-02 RX ORDER — KETOROLAC TROMETHAMINE 30 MG/ML
30 SYRINGE (ML) INJECTION ONCE
Refills: 0 | Status: DISCONTINUED | OUTPATIENT
Start: 2020-09-02 | End: 2020-09-02

## 2020-09-02 RX ADMIN — Medication 975 MILLIGRAM(S): at 21:23

## 2020-09-02 RX ADMIN — Medication 975 MILLIGRAM(S): at 22:23

## 2020-09-02 RX ADMIN — Medication 975 MILLIGRAM(S): at 10:15

## 2020-09-02 RX ADMIN — Medication 0.2 MILLIGRAM(S): at 23:30

## 2020-09-02 RX ADMIN — Medication 30 MILLIGRAM(S): at 04:11

## 2020-09-02 RX ADMIN — Medication 1000 MILLIUNIT(S)/MIN: at 02:48

## 2020-09-02 RX ADMIN — SODIUM CHLORIDE 3 MILLILITER(S): 9 INJECTION INTRAMUSCULAR; INTRAVENOUS; SUBCUTANEOUS at 22:00

## 2020-09-02 RX ADMIN — Medication 0.2 MILLIGRAM(S): at 12:38

## 2020-09-02 RX ADMIN — Medication 0.2 MILLIGRAM(S): at 17:41

## 2020-09-02 RX ADMIN — Medication 600 MILLIGRAM(S): at 17:41

## 2020-09-02 RX ADMIN — Medication 108 GRAM(S): at 01:45

## 2020-09-02 RX ADMIN — Medication 975 MILLIGRAM(S): at 09:28

## 2020-09-02 RX ADMIN — Medication 600 MILLIGRAM(S): at 13:15

## 2020-09-02 RX ADMIN — Medication 600 MILLIGRAM(S): at 12:38

## 2020-09-02 RX ADMIN — Medication 30 MILLIGRAM(S): at 05:11

## 2020-09-02 RX ADMIN — Medication 0.2 MILLIGRAM(S): at 05:30

## 2020-09-02 RX ADMIN — Medication 600 MILLIGRAM(S): at 18:30

## 2020-09-02 NOTE — OB PROVIDER DELIVERY SUMMARY - NSPROVIDERDELIVERYNOTE_OBGYN_ALL_OB_FT
She pushed effectively for 20 minutes, and delivered a viable male infant at 0248. Vertex delivered without difficulty, no nuchal cord noted, both shoulders then delivered without difficulty.  Pitocin started. Delayed cord clamping for approximately 20 seconds, the cord was then clamped and cut for skin to skin to be initiated. Cord segment was clamped and cut for cord blood collection. Placenta delivered spontaneously and intact at 0251. Membranes were clamped with a jem clamp and ring forceps to reveal a small accessory lobe.  Uterus, cervix, perineum and vagina were inspected and no lacerations were noted. Apgars 9, 9, . She pushed effectively for 20 minutes, and delivered a viable male infant at 0248. Vertex delivered without difficulty, no nuchal cord noted, both shoulders then delivered without difficulty.  Pitocin started. Delayed cord clamping for approximately 20 seconds, the cord was then clamped and cut for skin to skin to be initiated. Cord segment was clamped and cut for cord blood collection. Placenta delivered spontaneously and intact at 0251. Membranes were clamped with a jem clamp and ring forceps to reveal a small accessory lobe.  Uterus, cervix, perineum and vagina were inspected and no lacerations were noted. Apgars 9, 9, .    ATTENDING NOTE  I perform the delivery with the resident in it's entirety.  I agree with above note.    LETTY Cartwright MD

## 2020-09-02 NOTE — DISCHARGE NOTE OB - HOSPITAL COURSE
She is a 36yo now  who presented at 40 weeks 2 days GA for induction of labor and had a normal delivery. She had a normal postpartum course and was discharged home in stable condition. Upon discharge she was voiding, tolerating PO, and ambulating. She is a 34yo now  who presented at 40 weeks 2 days GA for induction of labor and had a normal delivery. She was given methergine postpartum for manual extraction of blood clots. She had a normal postpartum course and was discharged home in stable condition. Upon discharge she was voiding, tolerating PO, and ambulating.

## 2020-09-02 NOTE — CHART NOTE - NSCHARTNOTEFT_GEN_A_CORE
Nurse alerted this MD to postpartum bleeding in patient.    35 y.o.  PPD#0 s/p uncomplicated . Accessory lobe was noted after delivery of the placenta.     Patient was moved to wheelchair when her jt and 2 pads were noted to be saturated and to contain 250 cc of blood. Nurse alerted this MD to postpartum bleeding in patient.    35 y.o.  PPD#0 s/p uncomplicated . Accessory lobe was noted after delivery of the placenta.     Patient was moved to wheelchair when her jt and 2 sanitary pads were noted to be saturated. They were weighed and amounted to 250 cc of blood. A bedside sonogram was done to show clots in the fundus. Through manual extraction Nurse alerted this MD to postpartum bleeding in patient.    35 y.o.  PPD#0 s/p uncomplicated . Accessory lobe was noted after delivery of the placenta.     Patient was moved to wheelchair when her jt and 2 sanitary pads were noted to be saturated. They were weighed and amounted to 250 cc of blood. A bedside sonogram was done, which showed blood clots in the fundus. At bedside, Dr. Cartwright extracted 40cc of clots. Fundus was found to be firm below the umbilicus. Bedside ultrasound showed clots had been extracted.     Vital Signs Last 24 Hrs  T(C): 37 (02 Sep 2020 04:14), Max: 37 (02 Sep 2020 04:14)  T(F): 98.6 (02 Sep 2020 04:14), Max: 98.6 (02 Sep 2020 04:14)  HR: 69 (02 Sep 2020 05:47) (63 - 110)  BP: 105/65 (02 Sep 2020 05:46) (92/57 - 141/83)  RR: 18 (02 Sep 2020 01:52) (16 - 18)  SpO2: 97% (02 Sep 2020 05:47) (84% - 100%)    Abd: fundus firm below the umbilicus, 40cc of dark red blood clots extracted    35 y.o.  PPD#0 s/p  and manual blood clots extracted from uterus, total estimated blood loss to now be 460cc. Fundus is firm below the umbilicus.    - routine postpartum management  - PO methergine series  - repeat cbc at 7am     Discussed with Dr. Cartwright. Nurse alerted this MD to postpartum bleeding in patient.    35 y.o.  PPD#0 s/p uncomplicated . Accessory lobe was noted after delivery of the placenta.     Patient was moved to wheelchair when her jt and 2 sanitary pads were noted to be saturated. They were weighed and amounted to 250 cc of blood. A bedside sonogram was done, which showed blood clots in the fundus. At bedside, Dr. Cartwright extracted 40cc of clots. Fundus was found to be firm below the umbilicus..     Vital Signs Last 24 Hrs  T(C): 37 (02 Sep 2020 04:14), Max: 37 (02 Sep 2020 04:14)  T(F): 98.6 (02 Sep 2020 04:14), Max: 98.6 (02 Sep 2020 04:14)  HR: 69 (02 Sep 2020 05:47) (63 - 110)  BP: 105/65 (02 Sep 2020 05:46) (92/57 - 141/83)  RR: 18 (02 Sep 2020 01:52) (16 - 18)  SpO2: 97% (02 Sep 2020 05:47) (84% - 100%)    Abd: fundus firm below the umbilicus, 40cc of dark red blood clots extracted    35 y.o.  PPD#0 s/p  and manual blood clots extracted from the vagina, total estimated blood loss to now be 460cc. Fundus is firm below the umbilicus.    - routine postpartum management  - PO methergine series  - repeat cbc at 7am     Discussed with Dr. Cartwright.

## 2020-09-02 NOTE — OB RN DELIVERY SUMMARY - NS_SEPSISRSKCALC_OBGYN_ALL_OB_FT
EOS calculated successfully. EOS Risk Factor: 0.5/1000 live births ([facility] national incidence); GA=40w2d; Temp=98.42; ROM=9.25; GBS='Positive'; Antibiotics='No antibiotics or any antibiotics < 2 hrs prior to birth'

## 2020-09-02 NOTE — DISCHARGE NOTE OB - PATIENT PORTAL LINK FT
You can access the FollowMyHealth Patient Portal offered by [facility] by registering at the following website: [email or web address]. By joining [employer]’s FollowMyHealth portal, you will also be able to view your health information using other applications (apps) compatible with our system.

## 2020-09-02 NOTE — DISCHARGE NOTE OB - CARE PROVIDER_API CALL
Hawa Cartwright  Obstetrics and Gynecology  [address] [place] [id number]  Phone: [phone]  Fax: [phone]  Follow Up Time:

## 2020-09-02 NOTE — CHART NOTE - NSCHARTNOTEFT_GEN_A_CORE
FHT for past 30 min 130 moderate variability accels presents contractions 2-3 min. CAT 1. Patient is not on pitocin.  Continue current management.    LETTY Cartwright MD

## 2020-09-02 NOTE — CHART NOTE - NSCHARTNOTEFT_GEN_A_CORE
36 y/o   s/p Normal spontaneous vaginal delivery at 40w2d.     S.  Pt seen and examined at bedside. She reports that she is doing well and that her lochia is wnl. She denies any fevers, chills, lightheadedness or sob.        O:  Vital Signs Last 24 Hrs  T(C): 36.7 (02 Sep 2020 06:15), Max: 37 (02 Sep 2020 04:14)  T(F): 98 (02 Sep 2020 06:15), Max: 98.6 (02 Sep 2020 04:14)  HR: 65 (02 Sep 2020 06:15) (63 - 110)  BP: 118/78 (02 Sep 2020 06:15) (92/57 - 141/83)  RR: 18 (02 Sep 2020 06:15) (18 - 18)  SpO2: 98% (02 Sep 2020 06:15) (84% - 100%)    General: NAD, well nourished, A&Ox3  Abdomen: nontender, non distended, mildly tympanic, fundus firm at umbilicus    Labs:                        11.4   15.20 )-----------( 138      ( 02 Sep 2020 06:50 )             33.5       A/P:   36 y/o   s/p Normal spontaneous vaginal delivery at 40w2d PPD# 0.   - VSS  - Pt recovering well   - Labs reviewed wnl  - encourage to ambulate to help with gas  - continue routine postpartum care     Discussed with Dr. Gongora, Dr. Cartwright notified.

## 2020-09-02 NOTE — OB PROVIDER LABOR PROGRESS NOTE - ASSESSMENT
35 y.o.  at 40 weeks 2 days undergoing IOL on pitocin, s/p cytotec. Cat 1 FHT. Epidural in place.    - continue pitocin induction  - continuous toco, fetal and maternal heart monitoring    Discussed with Dr. Cartwright

## 2020-09-03 VITALS
SYSTOLIC BLOOD PRESSURE: 103 MMHG | HEART RATE: 79 BPM | OXYGEN SATURATION: 98 % | RESPIRATION RATE: 18 BRPM | TEMPERATURE: 98 F | DIASTOLIC BLOOD PRESSURE: 71 MMHG

## 2020-09-03 LAB
HCT VFR BLD CALC: 35.5 % — SIGNIFICANT CHANGE UP (ref 34.5–45)
HGB BLD-MCNC: 11.6 G/DL — SIGNIFICANT CHANGE UP (ref 11.5–15.5)

## 2020-09-03 PROCEDURE — G0463: CPT

## 2020-09-03 PROCEDURE — 36415 COLL VENOUS BLD VENIPUNCTURE: CPT

## 2020-09-03 PROCEDURE — 86850 RBC ANTIBODY SCREEN: CPT

## 2020-09-03 PROCEDURE — 81001 URINALYSIS AUTO W/SCOPE: CPT

## 2020-09-03 PROCEDURE — 59025 FETAL NON-STRESS TEST: CPT

## 2020-09-03 PROCEDURE — 85014 HEMATOCRIT: CPT

## 2020-09-03 PROCEDURE — 86780 TREPONEMA PALLIDUM: CPT

## 2020-09-03 PROCEDURE — 85018 HEMOGLOBIN: CPT

## 2020-09-03 PROCEDURE — 85027 COMPLETE CBC AUTOMATED: CPT

## 2020-09-03 PROCEDURE — 59050 FETAL MONITOR W/REPORT: CPT

## 2020-09-03 PROCEDURE — 87635 SARS-COV-2 COVID-19 AMP PRB: CPT

## 2020-09-03 PROCEDURE — 86900 BLOOD TYPING SEROLOGIC ABO: CPT

## 2020-09-03 PROCEDURE — 86901 BLOOD TYPING SEROLOGIC RH(D): CPT

## 2020-09-03 RX ADMIN — Medication 600 MILLIGRAM(S): at 06:22

## 2020-09-03 RX ADMIN — Medication 600 MILLIGRAM(S): at 05:48

## 2020-09-03 NOTE — PROGRESS NOTE ADULT - ATTENDING COMMENTS
Patient seen and examined  Vs and labs reviewed  Abdomen: fundus firm/NT  Lower ext: no calf tenderness bilaterally    PPD#2 s/p  - stable  discharge to home today  f/u and postpartum instructions reviewed  Marielena Farr MD

## 2020-09-03 NOTE — PROGRESS NOTE ADULT - SUBJECTIVE AND OBJECTIVE BOX
Name: ROSETTA ALVAREZ  MRN: [id number]  Date Admitted: 20  Location: [facility] 2011 ([facility] 2EST)  Attending: Hawa Cartwright Rose      Post Partum: Vaginal Delivery Progress Note    ROSETTA ALVAREZ is a 35y  s/p  PPD# 1 of a viable male infant.     SUBJECTIVE:  No acute events overnight. Pain is well controlled with PRN pain medication. No problems with ambulating, voiding, or PO intake. Has had flatus but no BM. Denies N/V. Patient is having normal lochia which is decreasing.    She is bottle feeding.    OBJECTIVE:    Vital Signs Last 24 Hrs  T(C): 36.7 (03 Sep 2020 04:06), Max: 36.8 (02 Sep 2020 15:36)  T(F): 98.1 (03 Sep 2020 04:06), Max: 98.2 (02 Sep 2020 15:36)  HR: 79 (03 Sep 2020 04:06) (65 - 79)  BP: 103/71 (03 Sep 2020 04:06) (103/71 - 118/78)  RR: 18 (03 Sep 2020 04:06) (18 - 18)  SpO2: 98% (03 Sep 2020 04:06) (98% - 98%)      Physical exam:  General: AOx3, NAD.  Heart: RRR. S1S2.  Lungs: CTABL. Good airflow bilaterally.   Abdomen: +BS, Soft, appropriately tender to palpitation, firm uterine fundus at umbilicus.  Pelvic: Lochia normal  Ext: No DVT signs, warm extremities.        LABS:                        11.4   15.20 )-----------( 138      ( 02 Sep 2020 06:50 )             33.5

## 2020-09-14 ENCOUNTER — APPOINTMENT (OUTPATIENT)
Dept: OBGYN | Facility: CLINIC | Age: 35
End: 2020-09-14
Payer: COMMERCIAL

## 2020-09-14 VITALS
SYSTOLIC BLOOD PRESSURE: 116 MMHG | DIASTOLIC BLOOD PRESSURE: 78 MMHG | HEIGHT: 65 IN | BODY MASS INDEX: 23.41 KG/M2 | WEIGHT: 140.5 LBS

## 2020-09-14 PROCEDURE — 99213 OFFICE O/P EST LOW 20 MIN: CPT

## 2020-09-15 DIAGNOSIS — O36.8130 DECREASED FETAL MOVEMENTS, THIRD TRIMESTER, NOT APPLICABLE OR UNSPECIFIED: ICD-10-CM

## 2020-09-15 DIAGNOSIS — K59.00 DISEASES OF THE DIGESTIVE SYSTEM COMPLICATING PREGNANCY, THIRD TRIMESTER: ICD-10-CM

## 2020-09-15 DIAGNOSIS — O99.613 DISEASES OF THE DIGESTIVE SYSTEM COMPLICATING PREGNANCY, THIRD TRIMESTER: ICD-10-CM

## 2020-09-15 DIAGNOSIS — O09.43 SUPERVISION OF PREGNANCY WITH GRAND MULTIPARITY, THIRD TRIMESTER: ICD-10-CM

## 2020-09-21 NOTE — DISCHARGE NOTE OB - THE PATIENT HAS
"[facility] ICU 5th Floor  Palliative Medicine  Consult Note    Patient Name: Huy Cisneros  MRN: [id number]  Admission Date: 9/20/2020  Hospital Length of Stay: 0 days  Code Status: Full Code   Attending Provider: Marynaa Patel*  Consulting Provider: Celine Conde NP  Primary Care Physician: Primary Doctor No  Principal Problem:Sepsis    Patient information was obtained from patient, past medical records and ER records.      Inpatient consult to Palliative Care  Consult performed by: Celine Conde NP  Consult ordered by: Perla Mckee NP        Assessment/Plan:     Goals of care, counseling/discussion  Advance Care Planning     [facility]  I engaged the patient in a conversation about advance care planning and we specifically addressed what the goals of care would be moving forward, in light of the patient's change in clinical status, specifically patient cancer has progressed and causing liver issues. Patient very tearful and concerned about her condition. "I was a person who took care of herself and this happened to me. I have my babies to take care of."  We did not specifically address the patient's likely prognosis. We explored the patient's values and preferences for future care.  The patient endorses that what is most important right now is to focus on extending life as long as possible, even it it means sacrificing quality    Accordingly, we have decided that the best plan to meet the patient's goals includes continuing with treatment    I did not explain the role for hospice care at this stage of the patient's illness, including its ability to help the patient live with the best quality of life possible.  We will not be making a hospice referral.    I spent a total of 30 minutes engaging the patient in this advance care planning discussion.         Code Status  In light of the patients advanced and life limiting illness,I engaged the the patient in a " "conversation about the patient's preferences for care  at the very end of life. The patient wishes to have a natural, peaceful death.  Along those lines, the patient does not wish to have CPR or other invasive treatments performed when her heart and/or breathing stops."I want everything done for me. I have my babies to live for."   I spent a total of 30 minutes engaging the patient in this advance care planning discussion.             Thank you for your consult. I will follow-up with patient. Please contact us if you have any additional questions.    Subjective:     HPI:   31 year-old female with metastatic colon cancer was admitted on 9/21/20 for worsening abdominal pain/dyspnea upon exertion. She was recently discharged in early September 2020 after a prolonged hospitalization for MRSA bacteremia.     Palliative medicine has been consulted for GOC discussion.   Patient very tearful on today. Very concerned about her condition. Primary MD had spoken to patient that her cancer has progressed. "I was a person who always took care of myself and this has happened to me. I want to live for my babies." Patient has 10, 6, 3 year old children. Her mother cares for the children. Patient would like to continue with seeking treatment at this time. "I want to get everything done to keep me alive." Patient very soft spoken and depressed. She can benefit from lexapro for depression. At this time she would like to remain a full code. All questions and concerns addressed. Contact information given. Thanks for the consult.     Hospital Course:  No notes on file        Past Medical History:   Diagnosis Date    Cancer     Colon CA with mets to the liver    Hypertension 9/21/2020    Uterine cyst        Past Surgical History:   Procedure Laterality Date    COLONOSCOPY N/A 7/15/2020    Procedure: COLONOSCOPY;  Surgeon: Hi Drake MD;  Location: [facility];  Service: Endoscopy;  Laterality: N/A;    COLONOSCOPY W/ " BIOPSIES      INSERTION OF TUNNELED CENTRAL VENOUS CATHETER (CVC) WITH SUBCUTANEOUS PORT N/A 7/17/2020    Procedure: INSERTION, PORT-A-CATH;  Surgeon: Armani Naqvi MD;  Location: [facility];  Service: General;  Laterality: N/A;       Review of patient's allergies indicates:   Allergen Reactions    Sulfa (sulfonamide antibiotics)        Medications:  Continuous Infusions:  Scheduled Meds:   enoxaparin  40 mg Subcutaneous Q24H    metoprolol tartrate  50 mg Oral TID    oxyCODONE-acetaminophen  1 tablet Oral Once    vancomycin (VANCOCIN) IVPB  1,500 mg Intravenous Q12H     PRN Meds:morphine, ondansetron, sodium chloride 0.9%, Pharmacy to dose Vancomycin consult **AND** vancomycin - pharmacy to dose    Family History     Problem Relation (Age of Onset)    Hypertension Mother, Maternal Grandmother, Maternal Grandfather, Paternal Grandmother        Tobacco Use    Smoking status: Never Smoker    Smokeless tobacco: Never Used   Substance and Sexual Activity    Alcohol use: No    Drug use: No    Sexual activity: Yes     Partners: Male     Birth control/protection: None       Review of Systems   Constitutional: Positive for fatigue.   HENT: Negative for sore throat.    Eyes: Negative for visual disturbance.   Respiratory: Positive for shortness of breath. Negative for cough.    Cardiovascular: Negative for chest pain.   Gastrointestinal: Positive for abdominal pain. Negative for nausea and vomiting.   Genitourinary: Negative for dysuria.   Musculoskeletal: Negative for back pain.   Skin: Negative for rash.   Neurological: Positive for weakness. Negative for headaches.   Hematological: Negative for adenopathy.   Psychiatric/Behavioral: The patient is not nervous/anxious.      Objective:     Vital Signs (Most Recent):  Temp: 98.6 °F (37 °C) (09/21/20 1130)  Pulse: (!) 135 (09/21/20 1415)  Resp: (!) 32 (09/21/20 1415)  BP: 139/80 (09/21/20 1415)  SpO2: 100 % (09/21/20 1415) Vital Signs (24h Range):  Temp:  [98.2 °F  "(36.8 °C)-98.6 °F (37 °C)] 98.6 °F (37 °C)  Pulse:  [129-144] 135  Resp:  [12-44] 32  SpO2:  [99 %-100 %] 100 %  BP: (123-150)/(74-94) 139/80     Weight: 84.5 kg (186 lb 4.6 oz)  Body mass index is 31 kg/m².    Physical Exam  Vitals signs and nursing note reviewed.   Constitutional:       Appearance: She is well-developed.      Comments: Fatigued, appears weak   HENT:      Head: Normocephalic and atraumatic.   Eyes:      Pupils: Pupils are equal, round, and reactive to light.   Neck:      Musculoskeletal: Normal range of motion and neck supple.   Cardiovascular:      Rate and Rhythm: Regular rhythm. Tachycardia present.   Pulmonary:      Effort: Pulmonary effort is normal.      Breath sounds: Normal breath sounds.   Abdominal:      General: Bowel sounds are normal.      Palpations: Abdomen is soft.      Tenderness: There is abdominal tenderness.   Musculoskeletal: Normal range of motion.   Skin:     General: Skin is warm and dry.   Neurological:      Mental Status: She is alert.      Comments: Drowsy, answers questions in a whisper, unclear answers         Review of Symptoms    Symptom Assessment (ESAS 0-10 Scale)  Pain:  0  Dyspnea:  2  Anxiety:  0  Nausea:  0  Depression:  5  Anorexia:  0  Fatigue:  5  Insomnia:  0  Restlessness:  0  Agitation:  0     CAM / Delirium:  Negative  Constipation:  Negative  Diarrhea:  Negative    Bowel Management Plan (BMP):  Yes            ECOG Performance Status Grade:  1 - Ambulates, capable of light work    Living Arrangements:  Lives with family    Spiritual:  F - Sania and Belief:  "I have prayed to God. I want to live."      Advance Care Planning   Advance Care Planning       Significant Labs: None  CBC:   Recent Labs   Lab 09/21/20  0517   WBC 9.36   HGB 7.0*   HCT 22.3*   MCV 85        BMP:  Recent Labs   Lab 09/21/20  0517      *   K 4.0      CO2 14*   BUN 13   CREATININE 0.7   CALCIUM 7.9*     LFT:  Lab Results   Component Value Date     (H) " 09/21/2020    ALKPHOS 372 (H) 09/21/2020    BILITOT 2.8 (H) 09/21/2020     Albumin:   Albumin   Date Value Ref Range Status   09/21/2020 2.3 (L) 3.5 - 5.2 g/dL Final     Protein:   Total Protein   Date Value Ref Range Status   09/21/2020 8.2 6.0 - 8.4 g/dL Final     Lactic acid:   Lab Results   Component Value Date    LACTATE 3.1 (H) 09/21/2020    LACTATE 4.3 (HH) 09/21/2020       Significant Imaging: None     Recommendations  Continue with treatment  Full code  Lexapro 10mg  for depression  Palliative medicine clinic appointment upon discharge.           > 50% of 60 min visit spent in chart review, face to face discussion of goals of care,  symptom assessment, coordination of care and emotional support.  30 min Advance care planning    Celine Conde, MSN, APRN, NP-C  Palliative Medicine  [facility] ICU 5th Floor             no difficulties

## 2020-09-22 NOTE — HISTORY OF PRESENT ILLNESS
[Postpartum Follow Up] : postpartum follow up [Delivery Date: ___] : on [unfilled] [] : delivered by vaginal delivery [Male] : Delivery History: baby boy [None] : No associated symptoms are reported [Examination Of The Breasts] : breasts are normal [Breastfeeding] : currently nursing [Doing Well] : is doing well [Resumed Menses] : has not resumed her menses [Resumed [place]] : has not resumed intercourse [FreeTextEntry8] : Patient presents for PP visit. She reports breast pumping once a day. She is formula feeding. She reports minimal vaginal bleeding.  [de-identified] : patient encouraged to breast pump Q 2-3 hours. Return in 3 weeks for PP visit

## 2020-09-28 ENCOUNTER — APPOINTMENT (OUTPATIENT)
Dept: OBGYN | Facility: CLINIC | Age: 35
End: 2020-09-28
Payer: COMMERCIAL

## 2020-09-28 ENCOUNTER — APPOINTMENT (OUTPATIENT)
Dept: OBGYN | Facility: CLINIC | Age: 35
End: 2020-09-28

## 2020-09-28 VITALS
HEIGHT: 65 IN | WEIGHT: 146.56 LBS | BODY MASS INDEX: 24.42 KG/M2 | DIASTOLIC BLOOD PRESSURE: 70 MMHG | SYSTOLIC BLOOD PRESSURE: 110 MMHG

## 2020-09-28 PROCEDURE — 99213 OFFICE O/P EST LOW 20 MIN: CPT

## 2020-09-28 NOTE — HISTORY OF PRESENT ILLNESS
[Delivery Date: ___] : on [unfilled] [Breastfeeding] : currently nursing [None] : No associated symptoms are reported [Back to Normal] : is back to normal in size [Mild] : mild vaginal bleeding [Normal] : the vagina was normal [Examination Of The Breasts] : breasts are normal [Doing Well] : is doing well [Cervix Sample Taken] : cervical sample not taken for a Pap smear [FreeTextEntry8] : Patient presents for prenatal visit. She has no complaints. She denies any depressive symptoms [de-identified] : breast and bottle feeding [de-identified] : Contraception counseling.  Patient considering Paragard IUD [de-identified] : pap, HPV in 2 months

## 2020-11-03 ENCOUNTER — APPOINTMENT (OUTPATIENT)
Dept: OBGYN | Facility: CLINIC | Age: 35
End: 2020-11-03

## 2020-12-15 ENCOUNTER — APPOINTMENT (OUTPATIENT)
Dept: OBGYN | Facility: CLINIC | Age: 35
End: 2020-12-15
Payer: COMMERCIAL

## 2020-12-15 VITALS
DIASTOLIC BLOOD PRESSURE: 72 MMHG | HEIGHT: 65 IN | BODY MASS INDEX: 25.49 KG/M2 | WEIGHT: 153 LBS | HEART RATE: 70 BPM | SYSTOLIC BLOOD PRESSURE: 118 MMHG

## 2020-12-15 DIAGNOSIS — Z30.430 ENCOUNTER FOR INSERTION OF INTRAUTERINE CONTRACEPTIVE DEVICE: ICD-10-CM

## 2020-12-15 PROCEDURE — 99072 ADDL SUPL MATRL&STAF TM PHE: CPT

## 2020-12-15 PROCEDURE — 58300 INSERT INTRAUTERINE DEVICE: CPT

## 2020-12-15 NOTE — PROCEDURE
[IUD Placement] : intrauterine device (IUD) placement [Time out performed] : Pre-procedure time out performed.  Patient's name, date of birth and procedure confirmed. [Consent Obtained] : Consent obtained [Prevention of Pregnancy] : prevention of pregnancy [Risks] : risks [Benefits] : benefits [Alternatives] : alternatives [Patient] : patient [Infection] : infection [Bleeding] : bleeding [Pain] : pain [Expulsion] : expulsion [Failure] : failure [Uterine Perforation] : uterine perforation [Neg Pregnancy Test] : negative pregnancy test [Neg GC/Chlamydia] : negative GC/Chlamydia [No Premedication] : No premedication [Tenaculum] : Tenaculum [Easy Passage] : Easy passage [ParaGard IUD] : ParaGard IUD [Tolerated Well] : Patient tolerated the procedure well [None] : None [History of Unprotected [place]] : no history of unprotected intercourse [de-identified] : [id number] [de-identified] : 10/2026 [de-identified] : 12/2036

## 2020-12-20 LAB
C TRACH RRNA SPEC QL NAA+PROBE: NOT DETECTED
N GONORRHOEA RRNA SPEC QL NAA+PROBE: NOT DETECTED
SOURCE AMPLIFICATION: NORMAL

## 2021-01-07 ENCOUNTER — EMERGENCY (EMERGENCY)
Facility: HOSPITAL | Age: 36
LOS: 1 days | Discharge: HOME | End: 2021-01-07
Attending: EMERGENCY MEDICINE
Payer: COMMERCIAL

## 2021-01-07 VITALS
WEIGHT: 158.07 LBS | RESPIRATION RATE: 18 BRPM | DIASTOLIC BLOOD PRESSURE: 82 MMHG | HEART RATE: 107 BPM | SYSTOLIC BLOOD PRESSURE: 132 MMHG | HEIGHT: 66 IN | TEMPERATURE: 99 F | OXYGEN SATURATION: 100 %

## 2021-01-07 LAB — SARS-COV-2 RNA SPEC QL NAA+PROBE: DETECTED

## 2021-01-07 PROCEDURE — U0005: CPT

## 2021-01-07 PROCEDURE — 99284 EMERGENCY DEPT VISIT MOD MDM: CPT

## 2021-01-07 PROCEDURE — U0003: CPT

## 2021-01-07 PROCEDURE — 99283 EMERGENCY DEPT VISIT LOW MDM: CPT

## 2021-01-07 NOTE — ED PROVIDER NOTE - CLINICAL SUMMARY MEDICAL DECISION MAKING FREE TEXT BOX
Bossman DEY: Patient with covid exposure, now with fever and myalgias. Denies CP, SOB, anosmia, travel. VSS. Plan for covid swab and dc. Bossman DEY: Patient with covid exposure, now with fever and myalgias. Denies CP, SOB, anosmia, travel. VSS. Plan for covid swab and dc.   covid hand out given to call center for result and cont tylenol , motrin for the pain Bossman DEY: Patient with covid exposure, now with fever and myalgias. Denies CP, SOB, anosmia, travel. VSS. Plan for covid swab and dc.   covid hand out given to call center for result and cont tylenol , motrin for the pain.

## 2021-01-07 NOTE — ED PROVIDER NOTE - ATTENDING CONTRIBUTION TO CARE
Bossman: I performed a face to face bedside interview with patient regarding history of present illness, review of symptoms and past medical history. I completed an independent physical exam.  I have discussed patient's plan of care with advanced care provider.   I agree with note as stated above including HISTORY OF PRESENT ILLNESS, HIV, PAST MEDICAL/SURGICAL/FAMILY/SOCIAL HISTORY, ALLERGIES AND HOME MEDICATIONS, REVIEW OF SYSTEMS, PHYSICAL EXAM, MEDICAL DECISION MAKING and any PROGRESS NOTES during the time I functioned as the attending physician for this patient  unless otherwise noted. My brief assessment is as follows: Bossman DEY: Patient with covid exposure, now with fever and myalgias. Denies CP, SOB, anosmia, travel. VSS. Plan for covid swab and dc.

## 2021-01-07 NOTE — ED PROVIDER NOTE - OBJECTIVE STATEMENT
35 y.o female no sig PMh post partum 4month presnet in ER and  c.O 3 days of gen body aches and left more than right side  that she is taking tylenol in AM . states she had conatct with family 5 days ago who was + covid . states left side of body aches may contribute to lay on the left .denies any Numbness or tingling , chets pain , SOb , abd pain or N/v , . had diarrhea x 2 few days ago . denies any visual changes , difficulty talking or walking

## 2021-01-07 NOTE — ED ADULT TRIAGE NOTE - CHIEF COMPLAINT QUOTE
Patient c/o body aches and chills x 2 days. Patient states that she was exposed to someone with a +COVID test. Patient 4 months post partum. Patient c/o body aches and chills x 2 days and pain to left shoulder. Patient states that she was exposed to someone with a +COVID test. Patient 4 months post partum.

## 2021-01-07 NOTE — ED PROVIDER NOTE - NEUROLOGICAL, MLM
Alert and oriented, no focal deficits, UE and LE strength grossly intact , gait normal speech normal

## 2021-01-12 ENCOUNTER — APPOINTMENT (OUTPATIENT)
Dept: OBGYN | Facility: CLINIC | Age: 36
End: 2021-01-12

## 2021-08-18 NOTE — ED PROVIDER NOTE - PATIENT PORTAL LINK FT
You can access the FollowMyHealth Patient Portal offered by [facility] by registering at the following website: [email or web address]. By joining [employer]’s FollowMyHealth portal, you will also be able to view your health information using other applications (apps) compatible with our system. no

## 2022-08-24 NOTE — ED ADULT TRIAGE NOTE - PAIN RATING/NUMBER SCALE (0-10): REST
10 Dorsal Nasal Flap Text: The defect edges were debeveled with a #15 scalpel blade.  Given the location of the defect and the proximity to free margins a dorsal nasal flap was deemed most appropriate.  Using a sterile surgical marker, an appropriate dorsal nasal flap was drawn around the defect.    The area thus outlined was incised deep to adipose tissue with a #15 scalpel blade.  The skin margins were undermined to an appropriate distance in all directions utilizing iris scissors.

## 2023-02-24 NOTE — ED ADULT NURSE NOTE - NS ED NURSE LEVEL OF CONSCIOUSNESS SPEECH
Ninfa Yost called requesting a refill on the following medications:  Requested Prescriptions     Pending Prescriptions Disp Refills    Abatacept (ORENCIA CLICKJECT) 731 MG/ML SOAJ 4 mL 5     Sig: Inject 125 mg into the skin every 7 days     Pharmacy verified: [address]  . pv      Date of last visit: 1/24/2023  Date of next visit (if applicable): 5/24/5107 [facility] [employer]herent[facility]

## 2023-02-27 NOTE — ED ADULT NURSE NOTE - EXTENSIONS OF SELF_ADULT
What Type Of Note Output Would You Prefer (Optional)?: Bullet Format How Severe Is Your Skin Lesion?: mild Has Your Skin Lesion Been Treated?: not been treated Is This A New Presentation, Or A Follow-Up?: Growth None

## 2025-03-11 ENCOUNTER — APPOINTMENT (OUTPATIENT)
Dept: OBGYN | Facility: CLINIC | Age: 40
End: 2025-03-11
Payer: COMMERCIAL

## 2025-03-11 ENCOUNTER — NON-APPOINTMENT (OUTPATIENT)
Age: 40
End: 2025-03-11

## 2025-03-11 VITALS
HEIGHT: 65 IN | BODY MASS INDEX: 23.57 KG/M2 | SYSTOLIC BLOOD PRESSURE: 124 MMHG | DIASTOLIC BLOOD PRESSURE: 80 MMHG | WEIGHT: 141.5 LBS

## 2025-03-11 DIAGNOSIS — Z78.9 OTHER SPECIFIED HEALTH STATUS: ICD-10-CM

## 2025-03-11 DIAGNOSIS — Z30.09 ENCOUNTER FOR OTHER GENERAL COUNSELING AND ADVICE ON CONTRACEPTION: ICD-10-CM

## 2025-03-11 DIAGNOSIS — Z30.431 ENCOUNTER FOR ROUTINE CHECKING OF INTRAUTERINE CONTRACEPTIVE DEVICE: ICD-10-CM

## 2025-03-11 DIAGNOSIS — Z83.3 FAMILY HISTORY OF DIABETES MELLITUS: ICD-10-CM

## 2025-03-11 PROCEDURE — 99203 OFFICE O/P NEW LOW 30 MIN: CPT

## 2025-03-11 PROCEDURE — 99459 PELVIC EXAMINATION: CPT

## 2025-04-01 ENCOUNTER — APPOINTMENT (OUTPATIENT)
Dept: OBGYN | Facility: CLINIC | Age: 40
End: 2025-04-01

## 2025-05-13 NOTE — ED PROVIDER NOTE - DATE/TIME 2
Goal Outcome Evaluation:  Plan of Care Reviewed With: patient        Progress: no change  Outcome Evaluation: pt alert and oriented, ambulated to bathroom with standby assist, SOA and fatigue with exertion.  blood transfusion started.  denies pain, reports having last BM on 5/12, denies diarrhea.                              08-Jun-2019 05:20

## 2025-05-20 NOTE — OB RN PATIENT PROFILE - FALL HARM RISK TYPE OF ASSESSMENT
Anesthesia Evaluation     Patient summary reviewed and Nursing notes reviewed   no history of anesthetic complications:   NPO Solid Status: > 8 hours  NPO Liquid Status: > 8 hours           Airway   Mallampati: II  TM distance: >3 FB  Neck ROM: full  No difficulty expected  Dental - normal exam     Pulmonary    (+) ,sleep apnea  (-) asthma, rhonchi, decreased breath sounds, wheezes, not a smoker  Cardiovascular   Exercise tolerance: good (4-7 METS)    Rhythm: regular  Rate: normal    (+) dysrhythmias (has had bradycardia with previous procedures), hyperlipidemia  (-) hypertension, CAD, angina, FERRERA, murmur      Neuro/Psych  (-) seizures, CVA  GI/Hepatic/Renal/Endo    (+) GERD  (-) liver disease, no renal disease, diabetes, no thyroid disorder    Musculoskeletal     Abdominal     Abdomen: soft.   Substance History      OB/GYN          Other                    Anesthesia Plan    ASA 3     MAC   total IV anesthesia  intravenous induction     Anesthetic plan, risks, benefits, and alternatives have been provided, discussed and informed consent has been obtained with: patient.    CODE STATUS:          Admission

## 2025-08-01 ENCOUNTER — NON-APPOINTMENT (OUTPATIENT)
Age: 40
End: 2025-08-01